# Patient Record
Sex: FEMALE | Race: WHITE | NOT HISPANIC OR LATINO | Employment: OTHER | URBAN - METROPOLITAN AREA
[De-identification: names, ages, dates, MRNs, and addresses within clinical notes are randomized per-mention and may not be internally consistent; named-entity substitution may affect disease eponyms.]

---

## 2017-11-01 PROBLEM — M25.672 ANKLE STIFFNESS, LEFT: Status: ACTIVE | Noted: 2017-11-01

## 2017-11-01 PROBLEM — M79.672 LEFT FOOT PAIN: Status: ACTIVE | Noted: 2017-11-01

## 2019-03-19 PROBLEM — F43.9 STRESS AT HOME: Status: ACTIVE | Noted: 2019-03-19

## 2019-03-19 PROBLEM — K21.9 GASTROESOPHAGEAL REFLUX DISEASE: Status: ACTIVE | Noted: 2019-03-19

## 2019-03-19 PROBLEM — K44.9 HIATAL HERNIA: Status: ACTIVE | Noted: 2019-03-19

## 2019-03-19 PROBLEM — R14.0 ABDOMINAL DISTENSION (GASEOUS): Status: ACTIVE | Noted: 2019-03-19

## 2021-04-08 PROBLEM — B27.00 EBV (EPSTEIN-BARR VIRUS) VIREMIA: Status: ACTIVE | Noted: 2021-04-08

## 2022-12-27 PROBLEM — R00.2 PALPITATIONS: Status: ACTIVE | Noted: 2022-12-27

## 2022-12-27 PROBLEM — I70.0 AORTIC ATHEROSCLEROSIS (HCC): Status: ACTIVE | Noted: 2022-12-27

## 2022-12-29 ENCOUNTER — TELEPHONE (OUTPATIENT)
Dept: CARDIOLOGY | Facility: MEDICAL CENTER | Age: 77
End: 2022-12-29

## 2022-12-29 NOTE — TELEPHONE ENCOUNTER
Caller: Keyanna Novak      Topic/issue: Patient was calling about her pacer. She stated it keeps blinking orange and she has followed the advisement to try and troubleshoot fixing it herself and she was asking for a call back to further assist her      Callback Number: [phone]      Thank you    -Josue HERNDON

## 2022-12-30 NOTE — TELEPHONE ENCOUNTER
"Patient does not have pacemaker-- forward to Dede/Silvia--she is calling regarding her \"2 week\" monitor  "

## 2023-02-21 DIAGNOSIS — R00.2 PALPITATIONS: ICD-10-CM

## 2023-02-21 DIAGNOSIS — E03.4 HYPOTHYROIDISM DUE TO ACQUIRED ATROPHY OF THYROID: ICD-10-CM

## 2023-04-18 PROBLEM — J02.0 PHARYNGITIS DUE TO STREPTOCOCCUS SPECIES: Status: ACTIVE | Noted: 2023-04-18

## 2023-04-18 PROBLEM — J34.89 SINUS DRAINAGE: Status: ACTIVE | Noted: 2023-04-18
